# Patient Record
Sex: FEMALE | Race: WHITE | NOT HISPANIC OR LATINO | Employment: OTHER | URBAN - NONMETROPOLITAN AREA
[De-identification: names, ages, dates, MRNs, and addresses within clinical notes are randomized per-mention and may not be internally consistent; named-entity substitution may affect disease eponyms.]

---

## 2023-02-04 PROBLEM — R73.03 PREDIABETES: Status: ACTIVE | Noted: 2023-02-04

## 2023-02-04 PROBLEM — R93.89 ABNORMAL CT SCAN, CHEST: Status: ACTIVE | Noted: 2023-02-04

## 2023-02-04 PROBLEM — E78.5 HYPERLIPIDEMIA: Status: ACTIVE | Noted: 2023-02-04

## 2023-02-04 PROBLEM — R05.9 COUGH IN ADULT: Status: ACTIVE | Noted: 2023-02-04

## 2023-02-04 PROBLEM — I10 HYPERTENSION: Status: ACTIVE | Noted: 2023-02-04

## 2023-02-04 PROBLEM — R91.1 LUNG NODULE, SOLITARY: Status: ACTIVE | Noted: 2023-02-04

## 2023-02-04 RX ORDER — ATENOLOL AND CHLORTHALIDONE TABLET 50; 25 MG/1; MG/1
TABLET ORAL
COMMUNITY
Start: 2021-05-13 | End: 2023-03-22 | Stop reason: SDUPTHER

## 2023-02-04 RX ORDER — POTASSIUM CHLORIDE 1500 MG/1
TABLET, EXTENDED RELEASE ORAL
COMMUNITY
Start: 2021-09-22 | End: 2023-04-14 | Stop reason: SDUPTHER

## 2023-03-22 ENCOUNTER — OFFICE VISIT (OUTPATIENT)
Dept: PRIMARY CARE | Facility: CLINIC | Age: 73
End: 2023-03-22
Payer: MEDICARE

## 2023-03-22 VITALS
HEIGHT: 65 IN | WEIGHT: 134.5 LBS | DIASTOLIC BLOOD PRESSURE: 78 MMHG | BODY MASS INDEX: 22.41 KG/M2 | OXYGEN SATURATION: 98 % | HEART RATE: 70 BPM | SYSTOLIC BLOOD PRESSURE: 130 MMHG

## 2023-03-22 DIAGNOSIS — E78.2 MIXED HYPERLIPIDEMIA: ICD-10-CM

## 2023-03-22 DIAGNOSIS — I10 PRIMARY HYPERTENSION: ICD-10-CM

## 2023-03-22 DIAGNOSIS — Z00.00 MEDICARE ANNUAL WELLNESS VISIT, SUBSEQUENT: Primary | ICD-10-CM

## 2023-03-22 DIAGNOSIS — R73.03 PREDIABETES: ICD-10-CM

## 2023-03-22 DIAGNOSIS — R05.9 COUGH IN ADULT: ICD-10-CM

## 2023-03-22 PROBLEM — R91.1 LUNG NODULE, SOLITARY: Status: RESOLVED | Noted: 2023-02-04 | Resolved: 2023-03-22

## 2023-03-22 PROBLEM — R93.89 ABNORMAL CT SCAN, CHEST: Status: RESOLVED | Noted: 2023-02-04 | Resolved: 2023-03-22

## 2023-03-22 PROCEDURE — 3078F DIAST BP <80 MM HG: CPT | Performed by: INTERNAL MEDICINE

## 2023-03-22 PROCEDURE — 1159F MED LIST DOCD IN RCRD: CPT | Performed by: INTERNAL MEDICINE

## 2023-03-22 PROCEDURE — 1036F TOBACCO NON-USER: CPT | Performed by: INTERNAL MEDICINE

## 2023-03-22 PROCEDURE — G0439 PPPS, SUBSEQ VISIT: HCPCS | Performed by: INTERNAL MEDICINE

## 2023-03-22 PROCEDURE — 99214 OFFICE O/P EST MOD 30 MIN: CPT | Performed by: INTERNAL MEDICINE

## 2023-03-22 PROCEDURE — 1170F FXNL STATUS ASSESSED: CPT | Performed by: INTERNAL MEDICINE

## 2023-03-22 PROCEDURE — 3075F SYST BP GE 130 - 139MM HG: CPT | Performed by: INTERNAL MEDICINE

## 2023-03-22 RX ORDER — ATENOLOL AND CHLORTHALIDONE TABLET 50; 25 MG/1; MG/1
1 TABLET ORAL DAILY
Qty: 90 TABLET | Refills: 2 | Status: SHIPPED | OUTPATIENT
Start: 2023-03-22 | End: 2023-04-14 | Stop reason: SDUPTHER

## 2023-03-22 ASSESSMENT — ENCOUNTER SYMPTOMS
WHEEZING: 0
ARTHRALGIAS: 0
VOMITING: 0
CHEST TIGHTNESS: 0
NAUSEA: 0
BACK PAIN: 0
DIARRHEA: 0
COUGH: 0
PALPITATIONS: 0
ABDOMINAL PAIN: 0
SHORTNESS OF BREATH: 0
BLOOD IN STOOL: 0
FATIGUE: 0

## 2023-03-22 ASSESSMENT — ACTIVITIES OF DAILY LIVING (ADL)
GROCERY_SHOPPING: INDEPENDENT
DRESSING: INDEPENDENT
TAKING_MEDICATION: INDEPENDENT
MANAGING_FINANCES: INDEPENDENT
DOING_HOUSEWORK: INDEPENDENT
BATHING: INDEPENDENT

## 2023-03-22 ASSESSMENT — PATIENT HEALTH QUESTIONNAIRE - PHQ9
2. FEELING DOWN, DEPRESSED OR HOPELESS: NOT AT ALL
1. LITTLE INTEREST OR PLEASURE IN DOING THINGS: NOT AT ALL
SUM OF ALL RESPONSES TO PHQ9 QUESTIONS 1 AND 2: 0

## 2023-03-22 NOTE — ASSESSMENT & PLAN NOTE
Her blood pressure is currently very well controlled  She will continue with atenolol/chlorthalidone 50-25 mg 1 tablet daily  She is also on potassium chloride CR 20 mEq 1 tablet daily  We will continue to monitor her kidney function and electrolytes twice a year

## 2023-03-22 NOTE — ASSESSMENT & PLAN NOTE
Her most recent hemoglobin A1c from March 14 through [facility] was 6.3.  We have discussed the notion of a prediabetic state and she is doing everything correctly to help prevent diabetes in the future.  We will check another hemoglobin A1c in 6 months

## 2023-03-22 NOTE — PROGRESS NOTES
Pt is here today for a 6 month check up, she is also due for a [facility] wellness exam. Review labs.

## 2023-03-22 NOTE — PATIENT INSTRUCTIONS
Please remember to complete your advance directives including living will and power of  for healthcare.  Please also provide a copy for your chart here once completed  I will see you back in approximately 6 months and we will ask for fasting lab work to check your sugar and kidney function

## 2023-03-22 NOTE — PROGRESS NOTES
Subjective   Reason for Visit: Tasha Fernnadez is an 72 y.o. female here for a Medicare Wellness visit.     Past Medical, Surgical, and Family History reviewed and updated in chart.    Reviewed all medications by prescribing practitioner or clinical pharmacist (such as prescriptions, OTCs, herbal therapies and supplements) and documented in the medical record.    HPI  She is here today for her routine 6-month checkup and also to complete her annual Medicare wellness visit.  She states that recently she has had some higher levels of stress because she is having to 10 to her mother.  She states she has been placed in Adult Protective Services is having some issues with her memory.  Otherwise she does not have any signs of depression and she continues to take good care of herself.  She states she still tries to exercise regularly and also tries to eat a healthy diet.  We did conduct a review of systems and we also went through the Medicare questionnaire.  We also reviewed her most recent laboratory test results from [facility] from March 14.  We talked about the elevated glucose and hemoglobin A1c of 6.3.  We have discussed the notion of a prediabetic state and ways to sheth off diabetes in the future.  We also discussed her cholesterol and how her HDL cholesterol is 71 which is very good and helps protect her from the bad cholesterol.  I am not recommending any cholesterol medication.  We also briefly discussed the importance of completing her advance directives and we also talked about her chronic cough.  She has been seen by pulmonary, Dr. Castro, in November and he felt that the pulmonary nodules were of no threat and she did not need any subsequent scans.  She states he did advise that if she had any breathing difficulties to get back in touch with him right away.  Patient Care Team:  Rosana Cabrera DO as PCP - General  Rosana Cabrera DO as PCP - [employer] PCP   Review of Systems  "  Constitutional:  Negative for fatigue.   Respiratory:  Negative for cough, chest tightness, shortness of breath and wheezing.    Cardiovascular:  Negative for chest pain, palpitations and leg swelling.   Gastrointestinal:  Negative for abdominal pain, blood in stool, diarrhea, nausea and vomiting.   Musculoskeletal:  Negative for arthralgias and back pain.   Objective   Vitals:  /78   Pulse 70   Ht 1.651 m (5' 5\")   Wt 61 kg (134 lb 8 oz)   SpO2 98%   BMI 22.38 kg/m²       Physical Exam  Vitals and nursing note reviewed.   Constitutional:       General: She is not in acute distress.     Appearance: Normal appearance.   HENT:      Head: Normocephalic and atraumatic.   Eyes:      Conjunctiva/sclera: Conjunctivae normal.   Cardiovascular:      Rate and Rhythm: Normal rate and regular rhythm.      Heart sounds: Normal heart sounds.   Pulmonary:      Effort: No respiratory distress.      Breath sounds: No wheezing.   Abdominal:      Palpations: Abdomen is soft.      Tenderness: There is no abdominal tenderness. There is no guarding.   Musculoskeletal:         General: No swelling. Normal range of motion.   Skin:     General: Skin is warm and dry.   Neurological:      General: No focal deficit present.      Mental Status: She is alert and oriented to person, place, and time.   Psychiatric:         Behavior: Behavior normal.       Assessment/Plan   Problem List Items Addressed This Visit    None    Problem List Items Addressed This Visit          Respiratory    Cough in adult       Circulatory    Hypertension     Her blood pressure is currently very well controlled  She will continue with atenolol/chlorthalidone 50-25 mg 1 tablet daily  She is also on potassium chloride CR 20 mEq 1 tablet daily  We will continue to monitor her kidney function and electrolytes twice a year         Relevant Medications    atenoloL-chlorthalidone (Tenoretic) 50-25 mg tablet    Other Relevant Orders    Follow Up In Primary Care    "    Endocrine/Metabolic    Prediabetes     Her most recent hemoglobin A1c from March 14 through [facility] was 6.3.  We have discussed the notion of a prediabetic state and she is doing everything correctly to help prevent diabetes in the future.  We will check another hemoglobin A1c in 6 months         Relevant Orders    Follow Up In Primary Care    Basic Metabolic Panel    Hemoglobin A1C       Other    Hyperlipidemia     Overall I am pleased with your cholesterol profile  Her last profile was done through [facility] on March 14, 2023.  She has an elevated total cholesterol and LDL but this is compensated her balance by her HDL of 71         Medicare annual wellness visit, subsequent - Primary

## 2023-03-22 NOTE — ASSESSMENT & PLAN NOTE
Overall I am pleased with your cholesterol profile  Her last profile was done through [facility] on March 14, 2023.  She has an elevated total cholesterol and LDL but this is compensated her balance by her HDL of 71

## 2023-04-14 DIAGNOSIS — I10 PRIMARY HYPERTENSION: ICD-10-CM

## 2023-04-14 RX ORDER — POTASSIUM CHLORIDE 1500 MG/1
20 TABLET, EXTENDED RELEASE ORAL DAILY
Qty: 90 TABLET | Refills: 1 | Status: SHIPPED | OUTPATIENT
Start: 2023-04-14 | End: 2023-09-27 | Stop reason: SDUPTHER

## 2023-04-14 RX ORDER — ATENOLOL AND CHLORTHALIDONE TABLET 50; 25 MG/1; MG/1
1 TABLET ORAL DAILY
Qty: 90 TABLET | Refills: 1 | Status: SHIPPED | OUTPATIENT
Start: 2023-04-14 | End: 2023-09-27 | Stop reason: SDUPTHER

## 2023-09-20 ENCOUNTER — APPOINTMENT (OUTPATIENT)
Dept: PRIMARY CARE | Facility: CLINIC | Age: 73
End: 2023-09-20
Payer: MEDICARE

## 2023-09-27 ENCOUNTER — OFFICE VISIT (OUTPATIENT)
Dept: PRIMARY CARE | Facility: CLINIC | Age: 73
End: 2023-09-27
Payer: MEDICARE

## 2023-09-27 VITALS
BODY MASS INDEX: 20.66 KG/M2 | DIASTOLIC BLOOD PRESSURE: 60 MMHG | WEIGHT: 124 LBS | SYSTOLIC BLOOD PRESSURE: 116 MMHG | OXYGEN SATURATION: 99 % | HEIGHT: 65 IN

## 2023-09-27 DIAGNOSIS — I10 PRIMARY HYPERTENSION: ICD-10-CM

## 2023-09-27 DIAGNOSIS — E78.2 MIXED HYPERLIPIDEMIA: Primary | ICD-10-CM

## 2023-09-27 DIAGNOSIS — Z12.31 ENCOUNTER FOR SCREENING MAMMOGRAM FOR MALIGNANT NEOPLASM OF BREAST: ICD-10-CM

## 2023-09-27 DIAGNOSIS — R73.03 PREDIABETES: ICD-10-CM

## 2023-09-27 PROCEDURE — 1036F TOBACCO NON-USER: CPT | Performed by: INTERNAL MEDICINE

## 2023-09-27 PROCEDURE — 99213 OFFICE O/P EST LOW 20 MIN: CPT | Performed by: INTERNAL MEDICINE

## 2023-09-27 PROCEDURE — 1160F RVW MEDS BY RX/DR IN RCRD: CPT | Performed by: INTERNAL MEDICINE

## 2023-09-27 PROCEDURE — 1159F MED LIST DOCD IN RCRD: CPT | Performed by: INTERNAL MEDICINE

## 2023-09-27 PROCEDURE — 3078F DIAST BP <80 MM HG: CPT | Performed by: INTERNAL MEDICINE

## 2023-09-27 PROCEDURE — 3074F SYST BP LT 130 MM HG: CPT | Performed by: INTERNAL MEDICINE

## 2023-09-27 RX ORDER — POTASSIUM CHLORIDE 20 MEQ/1
20 TABLET, EXTENDED RELEASE ORAL DAILY
Qty: 90 TABLET | Refills: 3 | Status: SHIPPED | OUTPATIENT
Start: 2023-09-27 | End: 2024-03-27 | Stop reason: SDUPTHER

## 2023-09-27 RX ORDER — ATENOLOL AND CHLORTHALIDONE TABLET 50; 25 MG/1; MG/1
1 TABLET ORAL DAILY
Qty: 90 TABLET | Refills: 3 | Status: SHIPPED | OUTPATIENT
Start: 2023-09-27 | End: 2024-03-27 | Stop reason: SDUPTHER

## 2023-09-27 ASSESSMENT — ENCOUNTER SYMPTOMS
BACK PAIN: 0
FATIGUE: 0
SHORTNESS OF BREATH: 0
BLOOD IN STOOL: 0
VOMITING: 0
NAUSEA: 0
WHEEZING: 0
ABDOMINAL PAIN: 0
PALPITATIONS: 0
DIARRHEA: 0
COUGH: 0

## 2023-09-27 ASSESSMENT — PATIENT HEALTH QUESTIONNAIRE - PHQ9
2. FEELING DOWN, DEPRESSED OR HOPELESS: NOT AT ALL
SUM OF ALL RESPONSES TO PHQ9 QUESTIONS 1 AND 2: 0
1. LITTLE INTEREST OR PLEASURE IN DOING THINGS: NOT AT ALL

## 2023-09-27 NOTE — PROGRESS NOTES
Subjective   Patient ID: Tasha Fernandez is a 73 y.o. female who presents for No chief complaint on file..  HPI  She is here today for her general 6-month checkup.  She is looking well and also reports feeling well.  Her blood pressure is excellent and her weight is healthy for her height.  We did review her most recent laboratory test results which she has performed at an outside lab.  Her fasting glucose was 114 and her hemoglobin A1c is consistent at 6.3.  She states she has been trying to maintain healthy lifestyle practices eating a healthy diet and exercising regularly.  We also briefly discussed getting the season's flu vaccine and she plans on getting it soon at one of her pharmacies.  We also discovered that she is due for breast cancer screening and we will help get her mammogram scheduled soon.  If everything goes according to plan we will see her back in approximately 6 months and she is reminded to get lab work done just prior to that visit.  Review of Systems   Constitutional:  Negative for fatigue.   Respiratory:  Negative for cough, shortness of breath and wheezing.    Cardiovascular:  Negative for chest pain, palpitations and leg swelling.   Gastrointestinal:  Negative for abdominal pain, blood in stool, diarrhea, nausea and vomiting.   Musculoskeletal:  Negative for back pain.     Objective   Physical Exam  Vitals and nursing note reviewed.   Constitutional:       General: She is not in acute distress.     Appearance: Normal appearance.   HENT:      Head: Normocephalic and atraumatic.   Eyes:      Conjunctiva/sclera: Conjunctivae normal.   Cardiovascular:      Rate and Rhythm: Normal rate and regular rhythm.      Heart sounds: Normal heart sounds.   Pulmonary:      Effort: No respiratory distress.      Breath sounds: No wheezing.   Abdominal:      Palpations: Abdomen is soft.      Tenderness: There is no abdominal tenderness. There is no guarding.   Musculoskeletal:         General: No swelling.  Normal range of motion.   Skin:     General: Skin is warm and dry.   Neurological:      General: No focal deficit present.      Mental Status: She is alert and oriented to person, place, and time.   Psychiatric:         Behavior: Behavior normal.         Assessment/Plan   Problem List Items Addressed This Visit             ICD-10-CM    Hyperlipidemia - Primary E78.5     -She will have a fasting lipid profile just prior to her next follow-up visit         Relevant Orders    Lipid panel    Hypertension I10     -Blood pressure is excellent  -She will continue taking atenolol-chlorthalidone 50-25 1 tablet daily  -Potassium chloride CR 20 mill equivalents daily         Relevant Medications    atenoloL-chlorthalidone (Tenoretic) 50-25 mg tablet    potassium chloride CR 20 mEq ER tablet    Prediabetes R73.03     -Hemoglobin A1c remains consistent at 6.3  -We will continue to monitor and she will continue with her healthy lifestyle practices         Relevant Orders    Hemoglobin A1C    Encounter for screening mammogram for malignant neoplasm of breast Z12.31    Relevant Orders    BI mammo bilateral screening tomosynthesis          Rosana Cabrera DO

## 2023-09-27 NOTE — ASSESSMENT & PLAN NOTE
-Hemoglobin A1c remains consistent at 6.3  -We will continue to monitor and she will continue with her healthy lifestyle practices

## 2023-09-27 NOTE — PATIENT INSTRUCTIONS
As we discussed the staff will be helping you to schedule your screening mammogram as you are overdue  Please let us know when you receive your flu vaccine for the season so we can update your immunization record  We would like to see you back in approximately 6 months and please remember to get fasting lab work done prior to that visit

## 2023-09-27 NOTE — ASSESSMENT & PLAN NOTE
-Blood pressure is excellent  -She will continue taking atenolol-chlorthalidone 50-25 1 tablet daily  -Potassium chloride CR 20 mill equivalents daily

## 2023-10-03 ENCOUNTER — ANCILLARY PROCEDURE (OUTPATIENT)
Dept: RADIOLOGY | Facility: CLINIC | Age: 73
End: 2023-10-03
Payer: MEDICARE

## 2023-10-03 DIAGNOSIS — Z12.31 ENCOUNTER FOR SCREENING MAMMOGRAM FOR MALIGNANT NEOPLASM OF BREAST: ICD-10-CM

## 2023-10-03 PROCEDURE — 77063 BREAST TOMOSYNTHESIS BI: CPT | Mod: BILATERAL PROCEDURE | Performed by: RADIOLOGY

## 2023-10-03 PROCEDURE — 77067 SCR MAMMO BI INCL CAD: CPT | Mod: BILATERAL PROCEDURE | Performed by: RADIOLOGY

## 2023-10-03 PROCEDURE — 77063 BREAST TOMOSYNTHESIS BI: CPT

## 2024-03-27 ENCOUNTER — OFFICE VISIT (OUTPATIENT)
Dept: PRIMARY CARE | Facility: CLINIC | Age: 74
End: 2024-03-27
Payer: MEDICARE

## 2024-03-27 VITALS
BODY MASS INDEX: 20.66 KG/M2 | HEIGHT: 65 IN | SYSTOLIC BLOOD PRESSURE: 136 MMHG | OXYGEN SATURATION: 98 % | WEIGHT: 124 LBS | DIASTOLIC BLOOD PRESSURE: 80 MMHG | HEART RATE: 56 BPM

## 2024-03-27 DIAGNOSIS — Z00.00 MEDICARE ANNUAL WELLNESS VISIT, SUBSEQUENT: Primary | ICD-10-CM

## 2024-03-27 DIAGNOSIS — I10 PRIMARY HYPERTENSION: ICD-10-CM

## 2024-03-27 DIAGNOSIS — E78.2 MIXED HYPERLIPIDEMIA: ICD-10-CM

## 2024-03-27 DIAGNOSIS — R73.03 PREDIABETES: ICD-10-CM

## 2024-03-27 PROBLEM — R05.9 COUGH IN ADULT: Status: RESOLVED | Noted: 2023-02-04 | Resolved: 2024-03-27

## 2024-03-27 PROCEDURE — 3075F SYST BP GE 130 - 139MM HG: CPT | Performed by: INTERNAL MEDICINE

## 2024-03-27 PROCEDURE — 1124F ACP DISCUSS-NO DSCNMKR DOCD: CPT | Performed by: INTERNAL MEDICINE

## 2024-03-27 PROCEDURE — 1159F MED LIST DOCD IN RCRD: CPT | Performed by: INTERNAL MEDICINE

## 2024-03-27 PROCEDURE — 3079F DIAST BP 80-89 MM HG: CPT | Performed by: INTERNAL MEDICINE

## 2024-03-27 PROCEDURE — 1036F TOBACCO NON-USER: CPT | Performed by: INTERNAL MEDICINE

## 2024-03-27 PROCEDURE — 1157F ADVNC CARE PLAN IN RCRD: CPT | Performed by: INTERNAL MEDICINE

## 2024-03-27 PROCEDURE — G0439 PPPS, SUBSEQ VISIT: HCPCS | Performed by: INTERNAL MEDICINE

## 2024-03-27 PROCEDURE — 1170F FXNL STATUS ASSESSED: CPT | Performed by: INTERNAL MEDICINE

## 2024-03-27 PROCEDURE — 99214 OFFICE O/P EST MOD 30 MIN: CPT | Performed by: INTERNAL MEDICINE

## 2024-03-27 RX ORDER — POTASSIUM CHLORIDE 20 MEQ/1
20 TABLET, EXTENDED RELEASE ORAL DAILY
Qty: 90 TABLET | Refills: 3 | Status: SHIPPED | OUTPATIENT
Start: 2024-03-27

## 2024-03-27 RX ORDER — ATENOLOL AND CHLORTHALIDONE TABLET 50; 25 MG/1; MG/1
1 TABLET ORAL DAILY
Qty: 90 TABLET | Refills: 3 | Status: SHIPPED | OUTPATIENT
Start: 2024-03-27

## 2024-03-27 ASSESSMENT — ENCOUNTER SYMPTOMS
DIARRHEA: 0
BACK PAIN: 0
BLOOD IN STOOL: 0
PALPITATIONS: 0
FATIGUE: 0
SHORTNESS OF BREATH: 0
VOMITING: 0
COUGH: 0
WHEEZING: 0
ARTHRALGIAS: 0
NAUSEA: 0
ABDOMINAL PAIN: 0

## 2024-03-27 ASSESSMENT — PATIENT HEALTH QUESTIONNAIRE - PHQ9
1. LITTLE INTEREST OR PLEASURE IN DOING THINGS: NOT AT ALL
2. FEELING DOWN, DEPRESSED OR HOPELESS: NOT AT ALL
2. FEELING DOWN, DEPRESSED OR HOPELESS: NOT AT ALL
SUM OF ALL RESPONSES TO PHQ9 QUESTIONS 1 AND 2: 0
1. LITTLE INTEREST OR PLEASURE IN DOING THINGS: NOT AT ALL
SUM OF ALL RESPONSES TO PHQ9 QUESTIONS 1 AND 2: 0

## 2024-03-27 ASSESSMENT — ACTIVITIES OF DAILY LIVING (ADL)
MANAGING_FINANCES: INDEPENDENT
DOING_HOUSEWORK: INDEPENDENT
BATHING: INDEPENDENT
DRESSING: INDEPENDENT
TAKING_MEDICATION: INDEPENDENT
GROCERY_SHOPPING: INDEPENDENT

## 2024-03-27 NOTE — PATIENT INSTRUCTIONS
As we discussed I am pleased with your overall checkup today and we will see you back in approximately 6 months for reevaluation  Please remember to put grab bars in your bathroom  Please also remember to provide a copy of both your living will and power of  for healthcare

## 2024-03-27 NOTE — PROGRESS NOTES
Subjective   Reason for Visit: Tasha Fernandez is an 73 y.o. female here for a Medicare Wellness visit.     Reviewed all medications by prescribing practitioner or clinical pharmacist (such as prescriptions, OTCs, herbal therapies and supplements) and documented in the medical record.    HPI  She is here today for her general checkup and we also took this opportunity to complete her annual Medicare wellness visit.  She is looking great and also reports feeling well.  We did conduct a full review of systems and we went through the Medicare questionnaire.  She denies any symptoms of depression and she has had no falls in the last year.  We talked about fall prevention and I have specifically recommended she put grab bars in the bathroom.  Her memory appears to be excellent and her hearing is also good.  She tries to adhere to a healthy diet and recently has been trying to eat salmon twice a week.  She also is very physically active and continues to exercise regularly.  She states she has established advanced directives and we remind her to bring a copy of both her living will and power of  for healthcare.  We also went over the results of recent lab work done through [facility].  Her cholesterol profile overall is excellent and she will continue with her healthy lifestyle practices.  Her hemoglobin A1c this time was 6.1 which is down from 6.3.  We have discussed the notion of a prediabetic state and she understands eating a healthy diet, exercising regularly, and staying close to ideal body weight will help reduce her risk of diabetes significantly.  If everything goes according to plan we will see her back in approximately 6 months for reevaluation.  Patient Care Team:  Rosana Cabrera DO as PCP - General  Rosana Cabrera DO as PCP - [employer] PCP     Review of Systems   Constitutional:  Negative for fatigue.   Respiratory:  Negative for cough, shortness of breath and wheezing.   "  Cardiovascular:  Negative for chest pain, palpitations and leg swelling.   Gastrointestinal:  Negative for abdominal pain, blood in stool, diarrhea, nausea and vomiting.   Musculoskeletal:  Negative for arthralgias and back pain.       Objective   Vitals:  /80   Pulse 56   Ht 1.651 m (5' 5\")   Wt 56.2 kg (124 lb)   SpO2 98%   BMI 20.63 kg/m²       Physical Exam  Vitals and nursing note reviewed.   Constitutional:       General: She is not in acute distress.     Appearance: Normal appearance.   HENT:      Head: Normocephalic and atraumatic.   Eyes:      Conjunctiva/sclera: Conjunctivae normal.   Cardiovascular:      Rate and Rhythm: Normal rate and regular rhythm.      Heart sounds: Normal heart sounds.   Pulmonary:      Effort: No respiratory distress.      Breath sounds: No wheezing.   Abdominal:      Palpations: Abdomen is soft.      Tenderness: There is no abdominal tenderness. There is no guarding.   Musculoskeletal:         General: No swelling. Normal range of motion.   Skin:     General: Skin is warm and dry.   Neurological:      General: No focal deficit present.      Mental Status: She is alert and oriented to person, place, and time.   Psychiatric:         Behavior: Behavior normal.         Assessment/Plan   Problem List Items Addressed This Visit       Hyperlipidemia    Current Assessment & Plan     -Her cholesterol profile is very good and we will check this once a year per Medicare protocol         Hypertension    Current Assessment & Plan     -Currently very well-controlled and we are providing refills on her medications today         Relevant Medications    atenoloL-chlorthalidone (Tenoretic) 50-25 mg tablet    potassium chloride CR 20 mEq ER tablet    Prediabetes    Current Assessment & Plan     -Her hemoglobin A1c improved and is now 6.1  -We will continue to monitor every 6 months         Relevant Orders    Basic Metabolic Panel    Hemoglobin A1C    Medicare annual wellness visit, " subsequent - Primary    Current Assessment & Plan     -We discussed fall prevention and I have specifically recommended she put grab bars in the bathroom  -We remind her to provide a copy of both her living will and power of  for health

## 2024-03-27 NOTE — ASSESSMENT & PLAN NOTE
-We discussed fall prevention and I have specifically recommended she put grab bars in the bathroom  -We remind her to provide a copy of both her living will and power of  for health

## 2024-09-23 LAB
ESTIMATED AVERAGE GLUCOSE FOR HBA1C EXTERNAL: 134 MG/DL
HEMOGLOBIN A1C/HEMOGLOBIN TOTAL IN BLOOD EXTERNAL: 6.3 %

## 2024-09-26 ENCOUNTER — APPOINTMENT (OUTPATIENT)
Dept: PRIMARY CARE | Facility: CLINIC | Age: 74
End: 2024-09-26
Payer: MEDICARE

## 2024-09-26 VITALS
HEIGHT: 65 IN | HEART RATE: 52 BPM | BODY MASS INDEX: 20.99 KG/M2 | WEIGHT: 126 LBS | SYSTOLIC BLOOD PRESSURE: 128 MMHG | OXYGEN SATURATION: 98 % | DIASTOLIC BLOOD PRESSURE: 80 MMHG

## 2024-09-26 DIAGNOSIS — Z12.31 ENCOUNTER FOR SCREENING MAMMOGRAM FOR MALIGNANT NEOPLASM OF BREAST: Primary | ICD-10-CM

## 2024-09-26 DIAGNOSIS — I10 PRIMARY HYPERTENSION: ICD-10-CM

## 2024-09-26 DIAGNOSIS — E78.2 MIXED HYPERLIPIDEMIA: ICD-10-CM

## 2024-09-26 DIAGNOSIS — Z78.0 MENOPAUSE: ICD-10-CM

## 2024-09-26 DIAGNOSIS — R73.03 PREDIABETES: ICD-10-CM

## 2024-09-26 PROCEDURE — 1157F ADVNC CARE PLAN IN RCRD: CPT | Performed by: INTERNAL MEDICINE

## 2024-09-26 PROCEDURE — 1123F ACP DISCUSS/DSCN MKR DOCD: CPT | Performed by: INTERNAL MEDICINE

## 2024-09-26 PROCEDURE — 3079F DIAST BP 80-89 MM HG: CPT | Performed by: INTERNAL MEDICINE

## 2024-09-26 PROCEDURE — 1160F RVW MEDS BY RX/DR IN RCRD: CPT | Performed by: INTERNAL MEDICINE

## 2024-09-26 PROCEDURE — 1159F MED LIST DOCD IN RCRD: CPT | Performed by: INTERNAL MEDICINE

## 2024-09-26 PROCEDURE — 3074F SYST BP LT 130 MM HG: CPT | Performed by: INTERNAL MEDICINE

## 2024-09-26 PROCEDURE — 1036F TOBACCO NON-USER: CPT | Performed by: INTERNAL MEDICINE

## 2024-09-26 PROCEDURE — 1158F ADVNC CARE PLAN TLK DOCD: CPT | Performed by: INTERNAL MEDICINE

## 2024-09-26 PROCEDURE — 3008F BODY MASS INDEX DOCD: CPT | Performed by: INTERNAL MEDICINE

## 2024-09-26 PROCEDURE — 99214 OFFICE O/P EST MOD 30 MIN: CPT | Performed by: INTERNAL MEDICINE

## 2024-09-26 RX ORDER — POTASSIUM CHLORIDE 20 MEQ/1
20 TABLET, EXTENDED RELEASE ORAL 2 TIMES DAILY
Qty: 180 TABLET | Refills: 1 | Status: SHIPPED | OUTPATIENT
Start: 2024-09-26

## 2024-09-26 RX ORDER — ATENOLOL AND CHLORTHALIDONE TABLET 50; 25 MG/1; MG/1
1 TABLET ORAL DAILY
Qty: 90 TABLET | Refills: 1 | Status: SHIPPED | OUTPATIENT
Start: 2024-09-26

## 2024-09-26 ASSESSMENT — PATIENT HEALTH QUESTIONNAIRE - PHQ9
1. LITTLE INTEREST OR PLEASURE IN DOING THINGS: NOT AT ALL
2. FEELING DOWN, DEPRESSED OR HOPELESS: NOT AT ALL
SUM OF ALL RESPONSES TO PHQ9 QUESTIONS 1 AND 2: 0

## 2024-09-26 ASSESSMENT — ENCOUNTER SYMPTOMS
BLOOD IN STOOL: 0
ABDOMINAL PAIN: 0
BACK PAIN: 0
FATIGUE: 0
PALPITATIONS: 0
ARTHRALGIAS: 0
COUGH: 0
WHEEZING: 0
SHORTNESS OF BREATH: 0
DIARRHEA: 0
VOMITING: 0
NAUSEA: 0

## 2024-09-26 NOTE — PROGRESS NOTES
Subjective   Patient ID: Tasha Fernandez is a 74 y.o. female who presents for Follow-up (6 MO FUV).  HPI  She is here today for her routine 6-month checkup.  Her blood pressure is excellent and her weight is perfect for her height.  We did conduct a full review of systems and we realized that she will be due for screening mammogram in just a couple of weeks.  She is agreeable to getting it scheduled.  We also reviewed her recent outside lab.  Unfortunately her potassium was low at 3.0.  We are reminded that she does take a diuretic and I do think she would benefit from taking 2 potassium tablets a day.  We also reviewed her hemoglobin A1c which came back at 6.3.  We discussed prevention of diabetes and we talked about the importance of maintaining an active lifestyle and watching the diet closely.  We also discussed the season's flu vaccine and she does plan on getting it in the near future.  We are providing refills today and if everything goes according to plan we will see her back in 6 months for reevaluation.  We also will be scheduling her DEXA scan to be done just prior to that visit.  Review of Systems   Constitutional:  Negative for fatigue.   Respiratory:  Negative for cough, shortness of breath and wheezing.    Cardiovascular:  Negative for chest pain, palpitations and leg swelling.   Gastrointestinal:  Negative for abdominal pain, blood in stool, diarrhea, nausea and vomiting.   Musculoskeletal:  Negative for arthralgias and back pain.     Objective   Physical Exam  Vitals and nursing note reviewed.   Constitutional:       General: She is not in acute distress.     Appearance: Normal appearance.   HENT:      Head: Normocephalic and atraumatic.   Eyes:      Conjunctiva/sclera: Conjunctivae normal.   Cardiovascular:      Rate and Rhythm: Normal rate and regular rhythm.      Heart sounds: Normal heart sounds.   Pulmonary:      Effort: No respiratory distress.      Breath sounds: No wheezing.   Abdominal:       Palpations: Abdomen is soft.      Tenderness: There is no abdominal tenderness. There is no guarding.   Musculoskeletal:         General: No swelling. Normal range of motion.   Skin:     General: Skin is warm and dry.   Neurological:      General: No focal deficit present.      Mental Status: She is alert and oriented to person, place, and time.   Psychiatric:         Behavior: Behavior normal.       Recent Results (from the past 672 hour(s))   Hemoglobin A1C    Collection Time: 09/23/24 12:00 AM   Result Value Ref Range    Hemoglobin A1C External 6.3 %    Estimated Average Glucose External 134 mg/dL       Assessment/Plan   Problem List Items Addressed This Visit             ICD-10-CM    Hyperlipidemia E78.5    Relevant Orders    Lipid Panel    Hypertension I10     -Blood pressure is under excellent control and we will continue with her current medical regimen         Relevant Medications    potassium chloride CR 20 mEq ER tablet    atenoloL-chlorthalidone (Tenoretic) 50-25 mg tablet    Prediabetes R73.03     -Hemoglobin A1c was 6.3 and we will continue to monitor         Relevant Orders    Hemoglobin A1C    Encounter for screening mammogram for malignant neoplasm of breast - Primary Z12.31    Relevant Orders    BI mammo bilateral screening tomosynthesis    Menopause Z78.0    Relevant Orders    XR DEXA bone density   PT INSTRUCTIONS  As we discussed I am pleased with your checkup today  Please remember to stay physically active and watch her diet closely  We have doubled your potassium because of the low potassium level recently and please call us if you are having any problems with the medication  The staff will be helping to schedule a DEXA scan to be done just prior to your next visit.  This test is testing for osteoporosis  We will see you back in 6 months and please remember to get your fasting lab work done prior to that visit  The staff will be helping to schedule your screening mammogram  Please notify us  when you get this season's flu vaccine       Rosana Cabrera, DO

## 2024-09-26 NOTE — PATIENT INSTRUCTIONS
As we discussed I am pleased with your checkup today  Please remember to stay physically active and watch her diet closely  We have doubled your potassium because of the low potassium level recently and please call us if you are having any problems with the medication  The staff will be helping to schedule a DEXA scan to be done just prior to your next visit.  This test is testing for osteoporosis  We will see you back in 6 months and please remember to get your fasting lab work done prior to that visit  The staff will be helping to schedule your screening mammogram  Please notify us when you get this season's flu vaccine

## 2025-03-26 ENCOUNTER — HOSPITAL ENCOUNTER (OUTPATIENT)
Dept: RADIOLOGY | Facility: CLINIC | Age: 75
End: 2025-03-26
Payer: MEDICARE

## 2025-03-26 ENCOUNTER — APPOINTMENT (OUTPATIENT)
Dept: RADIOLOGY | Facility: CLINIC | Age: 75
End: 2025-03-26
Payer: MEDICARE

## 2025-03-28 ENCOUNTER — HOSPITAL ENCOUNTER (OUTPATIENT)
Dept: RADIOLOGY | Facility: CLINIC | Age: 75
Discharge: HOME | End: 2025-03-28
Payer: MEDICARE

## 2025-03-28 VITALS — WEIGHT: 126 LBS | HEIGHT: 65 IN | BODY MASS INDEX: 20.99 KG/M2

## 2025-03-28 DIAGNOSIS — Z12.31 ENCOUNTER FOR SCREENING MAMMOGRAM FOR MALIGNANT NEOPLASM OF BREAST: ICD-10-CM

## 2025-03-28 DIAGNOSIS — Z78.0 MENOPAUSE: ICD-10-CM

## 2025-03-28 PROCEDURE — 77080 DXA BONE DENSITY AXIAL: CPT

## 2025-03-28 PROCEDURE — 77063 BREAST TOMOSYNTHESIS BI: CPT

## 2025-03-31 ENCOUNTER — APPOINTMENT (OUTPATIENT)
Age: 75
End: 2025-03-31
Payer: MEDICARE

## 2025-03-31 VITALS
WEIGHT: 127.1 LBS | BODY MASS INDEX: 21.18 KG/M2 | DIASTOLIC BLOOD PRESSURE: 74 MMHG | HEIGHT: 65 IN | HEART RATE: 56 BPM | SYSTOLIC BLOOD PRESSURE: 126 MMHG | OXYGEN SATURATION: 96 %

## 2025-03-31 DIAGNOSIS — Z00.00 MEDICARE ANNUAL WELLNESS VISIT, SUBSEQUENT: Primary | ICD-10-CM

## 2025-03-31 DIAGNOSIS — R73.03 PREDIABETES: ICD-10-CM

## 2025-03-31 DIAGNOSIS — I10 PRIMARY HYPERTENSION: ICD-10-CM

## 2025-03-31 DIAGNOSIS — M81.0 AGE-RELATED OSTEOPOROSIS WITHOUT CURRENT PATHOLOGICAL FRACTURE: ICD-10-CM

## 2025-03-31 DIAGNOSIS — E78.2 MIXED HYPERLIPIDEMIA: ICD-10-CM

## 2025-03-31 PROCEDURE — 3078F DIAST BP <80 MM HG: CPT | Performed by: INTERNAL MEDICINE

## 2025-03-31 PROCEDURE — G2211 COMPLEX E/M VISIT ADD ON: HCPCS | Performed by: INTERNAL MEDICINE

## 2025-03-31 PROCEDURE — G0439 PPPS, SUBSEQ VISIT: HCPCS | Performed by: INTERNAL MEDICINE

## 2025-03-31 PROCEDURE — 99214 OFFICE O/P EST MOD 30 MIN: CPT | Performed by: INTERNAL MEDICINE

## 2025-03-31 PROCEDURE — 1170F FXNL STATUS ASSESSED: CPT | Performed by: INTERNAL MEDICINE

## 2025-03-31 PROCEDURE — 3074F SYST BP LT 130 MM HG: CPT | Performed by: INTERNAL MEDICINE

## 2025-03-31 PROCEDURE — 1036F TOBACCO NON-USER: CPT | Performed by: INTERNAL MEDICINE

## 2025-03-31 PROCEDURE — 3008F BODY MASS INDEX DOCD: CPT | Performed by: INTERNAL MEDICINE

## 2025-03-31 PROCEDURE — 1157F ADVNC CARE PLAN IN RCRD: CPT | Performed by: INTERNAL MEDICINE

## 2025-03-31 PROCEDURE — 1124F ACP DISCUSS-NO DSCNMKR DOCD: CPT | Performed by: INTERNAL MEDICINE

## 2025-03-31 PROCEDURE — 1159F MED LIST DOCD IN RCRD: CPT | Performed by: INTERNAL MEDICINE

## 2025-03-31 RX ORDER — ATENOLOL AND CHLORTHALIDONE TABLET 50; 25 MG/1; MG/1
1 TABLET ORAL DAILY
Qty: 100 TABLET | Refills: 1 | Status: SHIPPED | OUTPATIENT
Start: 2025-03-31

## 2025-03-31 RX ORDER — POTASSIUM CHLORIDE 20 MEQ/1
20 TABLET, EXTENDED RELEASE ORAL 2 TIMES DAILY
Qty: 200 TABLET | Refills: 1 | Status: SHIPPED | OUTPATIENT
Start: 2025-03-31

## 2025-03-31 ASSESSMENT — ENCOUNTER SYMPTOMS
COUGH: 0
PALPITATIONS: 0
SHORTNESS OF BREATH: 0
ABDOMINAL PAIN: 0
FATIGUE: 0
ARTHRALGIAS: 0
BACK PAIN: 0
NAUSEA: 0
BLOOD IN STOOL: 0
DIARRHEA: 0
VOMITING: 0
WHEEZING: 0

## 2025-03-31 ASSESSMENT — ACTIVITIES OF DAILY LIVING (ADL)
MANAGING_FINANCES: INDEPENDENT
DRESSING: INDEPENDENT
DOING_HOUSEWORK: INDEPENDENT
TAKING_MEDICATION: INDEPENDENT
GROCERY_SHOPPING: INDEPENDENT
BATHING: INDEPENDENT

## 2025-03-31 NOTE — PATIENT INSTRUCTIONS
Patient instructions  As we discussed you will receive an official letter from radiology regarding your mammogram results.  When your bone density results come back I will contact you.  Please keep up the great work that you are doing with your lifestyle habits  Please remember to put grab bars in her bathroom  Please also remember to complete your advance directives i.e. living will and power of  for healthcare.  These would need to be notarized and we would like a copy for your chart here.  We invite you to return in 6 months and just prior to that visit you will be getting a fasting BMP and hemoglobin A1c to check your sugars  We will try to track down the results of your Cologuard

## 2025-03-31 NOTE — ASSESSMENT & PLAN NOTE
-Her cholesterol profile was done at [facility] on March 28 and it has improved significantly from the prior year  -She will continue with her healthy lifestyle habits including healthy diet and exercise  -We will check this again in 1 year per Medicare protocol

## 2025-03-31 NOTE — ASSESSMENT & PLAN NOTE
-Hemoglobin A1c also improved from 6.3 down to 6.0.  Lab was done at [facility] on 3-  -We will continue to monitor    Orders:    Basic Metabolic Panel; Future    Hemoglobin A1C; Future

## 2025-03-31 NOTE — ASSESSMENT & PLAN NOTE
-Her blood pressure control is excellent so we will continue with her current antihypertensive regimen    Orders:    atenoloL-chlorthalidone (Tenoretic) 50-25 mg tablet; Take 1 tablet by mouth once daily. as directed    potassium chloride CR 20 mEq ER tablet; Take 1 tablet (20 mEq) by mouth 2 times a day. Do not crush, chew, or split.    Basic Metabolic Panel; Future

## 2025-03-31 NOTE — ASSESSMENT & PLAN NOTE
-We discussed fall prevention  -We discussed continuing great exercise and healthy diet habits  -We discussed completing advanced directives including living will and power of  for healthcare

## 2025-03-31 NOTE — PROGRESS NOTES
Subjective   Reason for Visit: Tasha Fernandez is an 74 y.o. female here for a Medicare Wellness visit.     Reviewed all medications by prescribing practitioner or clinical pharmacist (such as prescriptions, OTCs, herbal therapies and supplements) and documented in the medical record.    HPI  She is here today for her routine 6-month checkup and we also took this opportunity to complete her annual Medicare wellness visit.  She is looking well and also reports feeling well.  She denies any symptoms of depression.  She has had no falls in the last year and has no issues with her balance.  We talked about fall prevention and I have specifically recommended she put grab bars in the bathroom.  Her memory is very good as she is highly independent doing everything for herself including managing her own medications and her own finances.  Her hearing is also good today.  She tries to eat a well-balanced diet and her weight is excellent for her height.  She also exercises regularly and we discussed the importance of continuing those great lifestyle habits.  We also discussed completing advanced directives including living will and power of  for healthcare.  She also had her lab work done at [facility] on March 29.  We reviewed those together.  Her hemoglobin A1c has improved from last year and dropped from 6.3 down to 6.0.  Her LDL cholesterol also improved by dropping from 133 down to 123.  I am pleased with her numbers and we will continue to monitor periodically.  She also completed a Cologuard kit she estimates within the past year or so.  We will try to track that down and she understands that that test is recommended every 3 years.  She also is completing her mammogram and DEXA scan in the near future.    Patient Care Team:  Rosana Cabrera DO as PCP - General  Rosana Cabrera DO as PCP - [employer] PCP     Review of Systems   Constitutional:  Negative for fatigue.   Respiratory:  Negative for  "cough, shortness of breath and wheezing.    Cardiovascular:  Negative for chest pain, palpitations and leg swelling.   Gastrointestinal:  Negative for abdominal pain, blood in stool, diarrhea, nausea and vomiting.   Musculoskeletal:  Negative for arthralgias and back pain.       Objective   Vitals:  /74   Pulse 56   Ht 1.651 m (5' 5\")   Wt 57.7 kg (127 lb 1.6 oz)   SpO2 96%   BMI 21.15 kg/m²       Physical Exam  Vitals and nursing note reviewed.   Constitutional:       General: She is not in acute distress.     Appearance: Normal appearance.   HENT:      Head: Normocephalic and atraumatic.   Eyes:      Conjunctiva/sclera: Conjunctivae normal.   Cardiovascular:      Rate and Rhythm: Normal rate and regular rhythm.      Heart sounds: Normal heart sounds.   Pulmonary:      Effort: No respiratory distress.      Breath sounds: No wheezing.   Abdominal:      Palpations: Abdomen is soft.      Tenderness: There is no abdominal tenderness. There is no guarding.   Musculoskeletal:         General: No swelling. Normal range of motion.   Skin:     General: Skin is warm and dry.   Neurological:      General: No focal deficit present.      Mental Status: She is alert and oriented to person, place, and time.   Psychiatric:         Behavior: Behavior normal.         Assessment & Plan  Primary hypertension  -Her blood pressure control is excellent so we will continue with her current antihypertensive regimen    Orders:    atenoloL-chlorthalidone (Tenoretic) 50-25 mg tablet; Take 1 tablet by mouth once daily. as directed    potassium chloride CR 20 mEq ER tablet; Take 1 tablet (20 mEq) by mouth 2 times a day. Do not crush, chew, or split.    Basic Metabolic Panel; Future    Medicare annual wellness visit, subsequent  -We discussed fall prevention  -We discussed continuing great exercise and healthy diet habits  -We discussed completing advanced directives including living will and power of  for healthcare     "     Mixed hyperlipidemia  -Her cholesterol profile was done at [facility] on March 28 and it has improved significantly from the prior year  -She will continue with her healthy lifestyle habits including healthy diet and exercise  -We will check this again in 1 year per Medicare protocol         Prediabetes  -Hemoglobin A1c also improved from 6.3 down to 6.0.  Lab was done at [facility] on 3-  -We will continue to monitor    Orders:    Basic Metabolic Panel; Future    Hemoglobin A1C; Future    Age-related osteoporosis without current pathological fracture    Orders:    Vitamin D 25-Hydroxy,Total (for eval of Vitamin D levels); Future    Patient instructions  As we discussed you will receive an official letter from radiology regarding your mammogram results.  When your bone density results come back I will contact you.  Please keep up the great work that you are doing with your lifestyle habits  Please remember to put grab bars in her bathroom  Please also remember to complete your advance directives i.e. living will and power of  for healthcare.  These would need to be notarized and we would like a copy for your chart here.  We invite you to return in 6 months and just prior to that visit you will be getting a fasting BMP and hemoglobin A1c to check your sugars  We will try to track down the results of your Cologuard

## 2025-04-10 ENCOUNTER — APPOINTMENT (OUTPATIENT)
Age: 75
End: 2025-04-10
Payer: MEDICARE

## 2025-04-11 ENCOUNTER — OFFICE VISIT (OUTPATIENT)
Age: 75
End: 2025-04-11
Payer: MEDICARE

## 2025-04-11 VITALS
DIASTOLIC BLOOD PRESSURE: 64 MMHG | BODY MASS INDEX: 21.36 KG/M2 | SYSTOLIC BLOOD PRESSURE: 120 MMHG | WEIGHT: 128.2 LBS | OXYGEN SATURATION: 99 % | HEIGHT: 65 IN | HEART RATE: 50 BPM

## 2025-04-11 DIAGNOSIS — M81.0 AGE-RELATED OSTEOPOROSIS WITHOUT CURRENT PATHOLOGICAL FRACTURE: ICD-10-CM

## 2025-04-11 DIAGNOSIS — E55.9 VITAMIN D DEFICIENCY: ICD-10-CM

## 2025-04-11 DIAGNOSIS — Z12.11 SCREENING FOR COLON CANCER: Primary | ICD-10-CM

## 2025-04-11 PROCEDURE — 3074F SYST BP LT 130 MM HG: CPT | Performed by: INTERNAL MEDICINE

## 2025-04-11 PROCEDURE — G2211 COMPLEX E/M VISIT ADD ON: HCPCS | Performed by: INTERNAL MEDICINE

## 2025-04-11 PROCEDURE — 99213 OFFICE O/P EST LOW 20 MIN: CPT | Performed by: INTERNAL MEDICINE

## 2025-04-11 PROCEDURE — 1157F ADVNC CARE PLAN IN RCRD: CPT | Performed by: INTERNAL MEDICINE

## 2025-04-11 PROCEDURE — 1159F MED LIST DOCD IN RCRD: CPT | Performed by: INTERNAL MEDICINE

## 2025-04-11 PROCEDURE — 1036F TOBACCO NON-USER: CPT | Performed by: INTERNAL MEDICINE

## 2025-04-11 PROCEDURE — 3078F DIAST BP <80 MM HG: CPT | Performed by: INTERNAL MEDICINE

## 2025-04-11 PROCEDURE — 3008F BODY MASS INDEX DOCD: CPT | Performed by: INTERNAL MEDICINE

## 2025-04-11 NOTE — ASSESSMENT & PLAN NOTE
- Her vitamin D level was a little under the desirable range  -She will start taking vitamin D3 2000 international units by mouth every day and will go back to the lab in 2 months for another vitamin D level.  I will contact her with the results and then we will discuss her decision regarding medication for osteoporosis

## 2025-04-11 NOTE — PROGRESS NOTES
Subjective   Patient ID: Tasha Fernandez is a 74 y.o. female who presents for Follow-up (REV TEST RESULTS ).  HPI  She is here today for follow-up.  She went for her vitamin D level and it came back a little bit below the desirable range.  I am asking that she start taking vitamin D3 2000 international units every day by mouth and she will go back to the lab in approximately 8 weeks for another vitamin D level.  We also reviewed her bone density and unfortunately her worst T-score was -3 in the lumbar spine.  We talked about going on medication.  I did forward several handouts that explain the bone density, osteoporosis, and medications.  We talked about starting the bisphosphonates which are typically the first-line therapy.  Specifically I suggested alendronate.  I told her that we would not start this medicine until we get her vitamin D level up to par.  She does want to do some more research about the medicines and will be contacted about the vitamin D we will discuss her preferences.  We also discussed colorectal cancer screening and for this year she would like to do Cologuard.  I have ordered the kit to be mailed directly to her home  Objective   Physical Exam  Vitals and nursing note reviewed.   Constitutional:       General: She is not in acute distress.     Appearance: Normal appearance.   HENT:      Head: Normocephalic and atraumatic.   Eyes:      Conjunctiva/sclera: Conjunctivae normal.   Cardiovascular:      Rate and Rhythm: Normal rate and regular rhythm.      Heart sounds: Normal heart sounds.   Pulmonary:      Effort: No respiratory distress.      Breath sounds: No wheezing.   Abdominal:      Palpations: Abdomen is soft.      Tenderness: There is no abdominal tenderness. There is no guarding.   Musculoskeletal:         General: No swelling. Normal range of motion.   Skin:     General: Skin is warm and dry.   Neurological:      General: No focal deficit present.      Mental Status: She is alert and  oriented to person, place, and time.   Psychiatric:         Behavior: Behavior normal.       Assessment/Plan   Problem List Items Addressed This Visit             ICD-10-CM    Screening for colon cancer - Primary Z12.11    Relevant Orders    Cologuard® colon cancer screening    Age-related osteoporosis without current pathological fracture M81.0     - Her worst T-score was -3 in the lumbar spine  -I have sent her literature on osteoporosis, bone density, and treatment  -She would like to do more research regarding medicines and I have specifically recommended the bisphosphonate such as alendronate  -We will discuss therapy after she gets her vitamin D rechecked in 2 months         Vitamin D deficiency E55.9     - Her vitamin D level was a little under the desirable range  -She will start taking vitamin D3 2000 international units by mouth every day and will go back to the lab in 2 months for another vitamin D level.  I will contact her with the results and then we will discuss her decision regarding medication for osteoporosis         Relevant Orders    Vitamin D 25-Hydroxy,Total (for eval of Vitamin D levels)   Patient instructions  As we discussed I would like you to start taking over-the-counter vitamin D3 2000 international units every day by mouth and please remember to go back to the lab in approximately 6 to 8 weeks for another vitamin D level.  Once the results are known I will contact you  -You are certainly welcome to continue doing research on medications for osteoporosis.  The common medicine typically used for first-line treatment are the bisphosphonates.  I have specifically recommended alendronate.  We will discuss this once we know your vitamin D level  I have issued an order for the Cologuard kit to arrive directly to your home and we will contact you once the results are known.  You are due in mid May  We will see you back as previously planned later this fall       Rosana Cabrera,

## 2025-04-11 NOTE — PATIENT INSTRUCTIONS
Patient instructions  As we discussed I would like you to start taking over-the-counter vitamin D3 2000 international units every day by mouth and please remember to go back to the lab in approximately 6 to 8 weeks for another vitamin D level.  Once the results are known I will contact you  -You are certainly welcome to continue doing research on medications for osteoporosis.  The common medicine typically used for first-line treatment are the bisphosphonates.  I have specifically recommended alendronate.  We will discuss this once we know your vitamin D level  I have issued an order for the Cologuard kit to arrive directly to your home and we will contact you once the results are known.  You are due in mid May  We will see you back as previously planned later this fall

## 2025-04-11 NOTE — ASSESSMENT & PLAN NOTE
- Her worst T-score was -3 in the lumbar spine  -I have sent her literature on osteoporosis, bone density, and treatment  -She would like to do more research regarding medicines and I have specifically recommended the bisphosphonate such as alendronate  -We will discuss therapy after she gets her vitamin D rechecked in 2 months

## 2025-04-15 ENCOUNTER — APPOINTMENT (OUTPATIENT)
Age: 75
End: 2025-04-15
Payer: MEDICARE

## 2025-04-23 LAB — NONINV COLON CA DNA+OCC BLD SCRN STL QL: POSITIVE

## 2025-06-11 DIAGNOSIS — E55.9 VITAMIN D DEFICIENCY: ICD-10-CM

## 2025-09-30 ENCOUNTER — APPOINTMENT (OUTPATIENT)
Age: 75
End: 2025-09-30
Payer: MEDICARE